# Patient Record
Sex: MALE | Race: WHITE
[De-identification: names, ages, dates, MRNs, and addresses within clinical notes are randomized per-mention and may not be internally consistent; named-entity substitution may affect disease eponyms.]

---

## 2018-10-08 ENCOUNTER — HOSPITAL ENCOUNTER (OUTPATIENT)
Dept: HOSPITAL 92 - BICRAD | Age: 23
Discharge: HOME | End: 2018-10-08
Attending: CHIROPRACTOR
Payer: COMMERCIAL

## 2018-10-08 DIAGNOSIS — S99.922A: Primary | ICD-10-CM
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
## 2018-10-08 DIAGNOSIS — R26.2: ICD-10-CM

## 2018-10-08 NOTE — RAD
THREE VIEWS LEFT FOOT:

 

Date: 10-8-18 

 

History: Injured left foot playing soccer. Trauma. Difficulty walking on left foot. Tenderness around
 2nd and 3rd metatarsals. 

 

FINDINGS: 

There is no fracture or dislocation seen. Lisfranc joint is normally aligned. No other osseous abnorm
ality. 

 

IMPRESSION: 

No acute osseous abnormality left foot. 

 

POS: [facility]